# Patient Record
Sex: MALE | Race: OTHER | HISPANIC OR LATINO | Employment: OTHER | URBAN - METROPOLITAN AREA
[De-identification: names, ages, dates, MRNs, and addresses within clinical notes are randomized per-mention and may not be internally consistent; named-entity substitution may affect disease eponyms.]

---

## 2023-07-31 ENCOUNTER — HOSPITAL ENCOUNTER (EMERGENCY)
Facility: HOSPITAL | Age: 38
Discharge: HOME | End: 2023-07-31
Attending: EMERGENCY MEDICINE

## 2023-07-31 VITALS
TEMPERATURE: 97.6 F | SYSTOLIC BLOOD PRESSURE: 116 MMHG | HEART RATE: 54 BPM | DIASTOLIC BLOOD PRESSURE: 61 MMHG | OXYGEN SATURATION: 99 % | RESPIRATION RATE: 20 BRPM

## 2023-07-31 DIAGNOSIS — L50.9 URTICARIA: Primary | ICD-10-CM

## 2023-07-31 PROCEDURE — 99282 EMERGENCY DEPT VISIT SF MDM: CPT

## 2023-07-31 PROCEDURE — 99284 EMERGENCY DEPT VISIT MOD MDM: CPT

## 2023-07-31 RX ORDER — PREDNISONE 20 MG/1
60 TABLET ORAL DAILY
Qty: 10 TABLET | Refills: 0 | Status: SHIPPED | OUTPATIENT
Start: 2023-07-31 | End: 2023-08-07

## 2023-07-31 RX ORDER — FEXOFENADINE HCL 60 MG/1
60 TABLET, FILM COATED ORAL 2 TIMES DAILY
Qty: 20 TABLET | Refills: 0 | Status: SHIPPED | OUTPATIENT
Start: 2023-07-31

## 2023-07-31 RX ORDER — DIAPER,BRIEF,INFANT-TODD,DISP
EACH MISCELLANEOUS
Qty: 15 G | Refills: 0 | Status: SHIPPED | OUTPATIENT
Start: 2023-07-31

## 2023-07-31 NOTE — DISCHARGE INSTRUCTIONS
Apply the cream two times daily for the next few days. Take Allegra 2 times per day. Start prednisone tomorrow morning as it may make it difficult to sleep. 3 pills tomorrow, then 2 tablets the next two days, then 1 tablet the next two days, then 1/2 tablet the next two days. Apply cool compresses to the area for further relief. Schedule an appointment with a primary care doctor for follow up. Return to the ER for: fevers, worsening rash, rash is persistent after a week, worsening or concerning symptoms overall.

## 2023-07-31 NOTE — ED PROVIDER NOTES
History  Chief Complaint   Patient presents with   • Rash     Patient reports rash on back; patient reports that rash itchy;     Toan Aaron is a 62-year-old male past medical history anxiety presenting to the ED for a pruritic rash on his back and hips x1 week. States he had this a few years ago, then moved to [place] and never got it again. Is back in the area visiting when the rash reappeared, wonders if it is the [place] food he is eating because that is the only identifiable thing that is known. No known changes in detergent, cleaning products. The rash is located to the lumbar back and migrating to the hips and lateral legs, is pruritic. Has not tried anything for relief. No fevers, chills, abscess, drainage, difficulty breathing, difficulty swallowing. History provided by:  Patient  Urticaria  Location:  Low back  Severity:  Moderate  Duration:  1 week  Timing:  Constant  Progression:  Worsening  Chronicity:  New  Associated symptoms: rash    Associated symptoms: no abdominal pain, no chest pain, no congestion, no cough, no fever, no headaches, no myalgias, no nausea, no rhinorrhea, no shortness of breath and no vomiting        None       History reviewed. No pertinent past medical history. History reviewed. No pertinent surgical history. History reviewed. No pertinent family history. I have reviewed and agree with the history as documented. E-Cigarette/Vaping     E-Cigarette/Vaping Substances     Social History     Tobacco Use   • Smoking status: Every Day     Types: Cigarettes   Substance Use Topics   • Alcohol use: Not Currently   • Drug use: Not Currently       Review of Systems   Constitutional: Negative for chills and fever. HENT: Negative for congestion, rhinorrhea and trouble swallowing. Eyes: Negative for photophobia, discharge, redness, itching and visual disturbance. Respiratory: Negative for cough and shortness of breath.     Cardiovascular: Negative for chest pain and palpitations. Gastrointestinal: Negative for abdominal pain, nausea and vomiting. Musculoskeletal: Negative for back pain, gait problem, myalgias, neck pain and neck stiffness. Skin: Positive for rash. Neurological: Negative for light-headedness and headaches. Psychiatric/Behavioral: Negative for dysphoric mood, self-injury and suicidal ideas. Physical Exam  Physical Exam  Vitals reviewed. Constitutional:       General: He is not in acute distress. Appearance: Normal appearance. He is not ill-appearing, toxic-appearing or diaphoretic. HENT:      Head: Normocephalic and atraumatic. Right Ear: Tympanic membrane, ear canal and external ear normal.      Left Ear: Tympanic membrane, ear canal and external ear normal.      Nose: Nose normal.      Mouth/Throat:      Mouth: Mucous membranes are moist.      Pharynx: Oropharynx is clear. No pharyngeal swelling, oropharyngeal exudate, posterior oropharyngeal erythema or uvula swelling. Comments: No signs for anaphylaxis or other reaction. Eyes:      General: No scleral icterus. Right eye: No discharge. Left eye: No discharge. Extraocular Movements: Extraocular movements intact. Conjunctiva/sclera: Conjunctivae normal.   Cardiovascular:      Rate and Rhythm: Normal rate and regular rhythm. Pulses: Normal pulses. Heart sounds: Normal heart sounds. Pulmonary:      Effort: Pulmonary effort is normal.      Breath sounds: Normal breath sounds. Musculoskeletal:         General: Normal range of motion. Cervical back: Normal range of motion and neck supple. No rigidity or tenderness. Lymphadenopathy:      Cervical: No cervical adenopathy. Skin:     General: Skin is warm and dry. Capillary Refill: Capillary refill takes less than 2 seconds. Findings: Rash present.  Rash is urticarial.      Comments: Blanchable rash consistent with urticaria noted to the lumbar area of the back bilaterally that extends to the hips. No areas of infection or abscess noted. Neurological:      General: No focal deficit present. Mental Status: He is alert. Psychiatric:         Mood and Affect: Mood normal.         Behavior: Behavior normal.         Vital Signs  ED Triage Vitals   Temperature Pulse Respirations Blood Pressure SpO2   07/31/23 1808 07/31/23 1807 07/31/23 1807 07/31/23 1807 07/31/23 1807   97.6 °F (36.4 °C) (!) 54 20 116/61 99 %      Temp Source Heart Rate Source Patient Position - Orthostatic VS BP Location FiO2 (%)   07/31/23 1808 -- -- -- --   Temporal          Pain Score       --                  Vitals:    07/31/23 1807   BP: 116/61   Pulse: (!) 54         Visual Acuity      ED Medications  Medications - No data to display    Diagnostic Studies  Results Reviewed     None                 No orders to display              Procedures  Procedures         ED Course                               SBIRT 22yo+    Flowsheet Row Most Recent Value   Initial Alcohol Screen: US AUDIT-C     1. How often do you have a drink containing alcohol? 0 Filed at: 07/31/2023 1850   2. How many drinks containing alcohol do you have on a typical day you are drinking? 0 Filed at: 07/31/2023 1850   3a. Male UNDER 65: How often do you have five or more drinks on one occasion? 0 Filed at: 07/31/2023 1850   Audit-C Score 0 Filed at: 07/31/2023 1850   KRIS: How many times in the past year have you. .. Used an illegal drug or used a prescription medication for non-medical reasons? Never Filed at: 07/31/2023 [facility] Making  52-year-old male presenting with an urticarial rash x1 week. No other signs for allergic reaction elsewhere. Prescribed a prednisone taper, antihistamine, hydrocortisone cream.  Further supportive care discussed. Discussed finding a trigger by eliminating certain things. Recommended follow-up with primary care doctor, but rash persists he can return to the ED.  Pt stable at time of discharge, vital signs reviewed, questions answered. Strict ER return precautions provided/discussed and were well understood by patient. A verbal dictation device was used in the writing of this note. Please excuse any grammatical errors or transposition of look a like/sound a like words. Urticaria: acute illness or injury  Risk  OTC drugs. Prescription drug management. Disposition  Final diagnoses:   Urticaria     Time reflects when diagnosis was documented in both MDM as applicable and the Disposition within this note     Time User Action Codes Description Comment    7/31/2023  6:40 PM Андрей Phan Add [L50.9] Urticaria       ED Disposition     ED Disposition   Discharge    Condition   Stable    Date/Time   Mon Jul 31, 2023  6:40 PM    [address] [facility] discharge to home/self care. Follow-up Information     Follow up With Specialties Details Why [address] [facility] Emergency Medicine Go to  If symptoms worsen [address] [address]  [address] [place] [address] Emergency Department, [address] [place] [address], [facility]          Discharge Medication List as of 7/31/2023  6:48 PM      START taking these medications    Details   fexofenadine (ALLEGRA) 60 MG tablet Take 1 tablet (60 mg total) by mouth 2 (two) times a day, Starting Mon 7/31/2023, Normal      hydrocortisone 1 % cream Apply to affected area 2 times daily, Normal      predniSONE 20 mg tablet Take 3 tablets (60 mg total) by mouth daily for 7 days Take 3 tablets x 1 day, then 2 tablets x 2 days, then 1 tablet x 2 days, then 1/2 tablet x 2 days, Starting Mon 7/31/2023, Until Mon 8/7/2023, Normal             No discharge procedures on file.     PDMP Review     None          ED Provider  Electronically Signed by           David Slaughter PA-C  07/31/23 2021